# Patient Record
Sex: FEMALE | Race: WHITE | URBAN - NONMETROPOLITAN AREA
[De-identification: names, ages, dates, MRNs, and addresses within clinical notes are randomized per-mention and may not be internally consistent; named-entity substitution may affect disease eponyms.]

---
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
## 2020-02-17 ENCOUNTER — TELEPHONE (OUTPATIENT)
Dept: FAMILY MEDICINE CLINIC | Facility: CLINIC | Age: 20
End: 2020-02-17

## 2020-02-17 DIAGNOSIS — Z91.013 SHRIMP ALLERGY: ICD-10-CM

## 2020-02-17 DIAGNOSIS — R21 RASH: Primary | ICD-10-CM

## 2020-02-17 NOTE — TELEPHONE ENCOUNTER
Spoke with patient, and she would like to go see Dr. Patricia Navarro in [facility]. Patient given provider information. However, she would like a call once the referral is actually authorized by insurance. Awaiting auth.

## 2020-02-17 NOTE — TELEPHONE ENCOUNTER
Dr Domitila Wiley and [address] [facility]  [address] [facility], [address]  [phone]  Or  Luan Kohli MD   Allergy & Immunology   [facility]     [address] [place] [address], [address] [facility]

## 2020-02-17 NOTE — TELEPHONE ENCOUNTER
Spoke with patient who would like an allergy test done. She states a few weeks ago, she was reaching into a bag of shrimp, and developed a redness on her arm with white spots. 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 Please advise.  If ok with

## 2020-02-20 NOTE — TELEPHONE ENCOUNTER
Message sent to Martínez to see if he can assist with the referral process as the referral is still open.

## 2020-02-20 NOTE — TELEPHONE ENCOUNTER
Emma Hathaway states no referral is needed per patient plan. Spoke with patient and she verbalized understanding. Patient given Dr. Ajay Ballard information.

## 2024-12-05 ENCOUNTER — PATIENT OUTREACH (OUTPATIENT)
Dept: CASE MANAGEMENT | Age: 24
End: 2024-12-05

## 2024-12-05 NOTE — PROCEDURES
The office order for PCP removal request is Approved and finalized on December 5, 2024.    Removed Bi Acuna MD as the patient's Primary Care Physician